# Patient Record
Sex: MALE | Race: WHITE | NOT HISPANIC OR LATINO | Employment: FULL TIME | URBAN - METROPOLITAN AREA
[De-identification: names, ages, dates, MRNs, and addresses within clinical notes are randomized per-mention and may not be internally consistent; named-entity substitution may affect disease eponyms.]

---

## 2021-10-20 ENCOUNTER — OFFICE VISIT - RIVER FALLS (OUTPATIENT)
Dept: FAMILY MEDICINE | Facility: CLINIC | Age: 31
End: 2021-10-20

## 2021-10-20 ASSESSMENT — MIFFLIN-ST. JEOR: SCORE: 1690.21

## 2021-10-21 ENCOUNTER — COMMUNICATION - RIVER FALLS (OUTPATIENT)
Dept: FAMILY MEDICINE | Facility: CLINIC | Age: 31
End: 2021-10-21

## 2021-10-21 LAB
A/G RATIO - HISTORICAL: 1.7 (ref 1–2.5)
ALBUMIN SERPL-MCNC: 4.6 GM/DL (ref 3.6–5.1)
ALP SERPL-CCNC: 70 UNIT/L (ref 36–130)
ALT SERPL W P-5'-P-CCNC: 15 UNIT/L (ref 9–46)
AST SERPL W P-5'-P-CCNC: 13 UNIT/L (ref 10–40)
BILIRUB DIRECT SERPL-MCNC: 0.2 MG/DL
BILIRUB INDIRECT SERPL-MCNC: 0.8 MG/DL (ref 0.2–1.2)
BILIRUB SERPL-MCNC: 1 MG/DL (ref 0.2–1.2)
BUN SERPL-MCNC: 15 MG/DL (ref 7–25)
BUN/CREAT RATIO - HISTORICAL: ABNORMAL (ref 6–22)
CALCIUM SERPL-MCNC: 10.2 MG/DL (ref 8.6–10.3)
CHLORIDE BLD-SCNC: 103 MMOL/L (ref 98–110)
CO2 SERPL-SCNC: 27 MMOL/L (ref 20–32)
CREAT SERPL-MCNC: 0.98 MG/DL (ref 0.6–1.35)
EGFRCR SERPLBLD CKD-EPI 2021: 104 ML/MIN/1.73M2
ERYTHROCYTE [DISTWIDTH] IN BLOOD BY AUTOMATED COUNT: 12.8 % (ref 11–15)
GLOBULIN: 2.7 (ref 1.9–3.7)
GLUCOSE BLD-MCNC: 130 MG/DL (ref 65–99)
HCT VFR BLD AUTO: 44.3 % (ref 38.5–50)
HGB BLD-MCNC: 14.7 GM/DL (ref 13.2–17.1)
MCH RBC QN AUTO: 29.7 PG (ref 27–33)
MCHC RBC AUTO-ENTMCNC: 33.2 GM/DL (ref 32–36)
MCV RBC AUTO: 89.5 FL (ref 80–100)
PLATELET # BLD AUTO: 302 10*3/UL (ref 140–400)
PMV BLD: 11.3 FL (ref 7.5–12.5)
POTASSIUM BLD-SCNC: 3.8 MMOL/L (ref 3.5–5.3)
PROT SERPL-MCNC: 7.3 GM/DL (ref 6.1–8.1)
RBC # BLD AUTO: 4.95 10*6/UL (ref 4.2–5.8)
SODIUM SERPL-SCNC: 141 MMOL/L (ref 135–146)
TSH SERPL DL<=0.005 MIU/L-ACNC: 0.85 MIU/L (ref 0.4–4.5)
WBC # BLD AUTO: 6.8 10*3/UL (ref 3.8–10.8)

## 2021-12-10 ENCOUNTER — OFFICE VISIT - RIVER FALLS (OUTPATIENT)
Dept: FAMILY MEDICINE | Facility: CLINIC | Age: 31
End: 2021-12-10

## 2021-12-10 ASSESSMENT — MIFFLIN-ST. JEOR: SCORE: 1690.21

## 2021-12-15 ENCOUNTER — OFFICE VISIT - RIVER FALLS (OUTPATIENT)
Dept: FAMILY MEDICINE | Facility: CLINIC | Age: 31
End: 2021-12-15

## 2021-12-15 ASSESSMENT — MIFFLIN-ST. JEOR: SCORE: 1676.6

## 2022-01-03 ENCOUNTER — COMMUNICATION - RIVER FALLS (OUTPATIENT)
Dept: FAMILY MEDICINE | Facility: CLINIC | Age: 32
End: 2022-01-03
Payer: COMMERCIAL

## 2022-01-06 ENCOUNTER — OFFICE VISIT - RIVER FALLS (OUTPATIENT)
Dept: FAMILY MEDICINE | Facility: CLINIC | Age: 32
End: 2022-01-06
Payer: COMMERCIAL

## 2022-02-12 VITALS
HEIGHT: 69 IN | BODY MASS INDEX: 23.99 KG/M2 | SYSTOLIC BLOOD PRESSURE: 126 MMHG | DIASTOLIC BLOOD PRESSURE: 68 MMHG | HEART RATE: 81 BPM | BODY MASS INDEX: 23.55 KG/M2 | DIASTOLIC BLOOD PRESSURE: 76 MMHG | SYSTOLIC BLOOD PRESSURE: 132 MMHG | WEIGHT: 159 LBS | OXYGEN SATURATION: 99 % | WEIGHT: 162 LBS | HEART RATE: 74 BPM | TEMPERATURE: 97.4 F | HEIGHT: 69 IN

## 2022-02-12 VITALS
OXYGEN SATURATION: 98 % | DIASTOLIC BLOOD PRESSURE: 78 MMHG | BODY MASS INDEX: 23.99 KG/M2 | TEMPERATURE: 97.3 F | WEIGHT: 162 LBS | HEIGHT: 69 IN | HEART RATE: 100 BPM | SYSTOLIC BLOOD PRESSURE: 116 MMHG

## 2022-02-15 NOTE — PROGRESS NOTES
Chief Complaint    Abdominal pain, vomiting and not sleeping well intermittenly.  History of Present Illness       Patient is here to discuss some diarrhea and vomiting.       Mom is on the phone to the system.  She gives him support because he has history of some delay.  For 5 days ago he started having frequent loose stools.  There is no blood in the stool.  He was nauseated also and did throw up several times.  He is much better now but he did throw up this morning at 6 AM.  He does feel generally tired but it has been improving.  He has kept some fluids down.  He does not have a history of this kind of problem.  Review of Systems       No fever or chills, no rashes, no headache  Physical Exam   Vitals & Measurements    T: 97.3  F (Tympanic)  HR: 100 (Peripheral)  BP: 116/78  SpO2: 98%     HT: 69 in  WT: 162 lb  BMI: 23.92        Alert and oriented       Supple neck       Lungs are clear       Abdomen is soft and nontender  Assessment/Plan       1. Diarrhea (R19.7)          His symptoms seem consistent with an acute viral gastroenteritis.  Mom has some overarching concerns about thyroid is an underlying issue so after discussion we can use Zofran to help him keep fluids down he is going to take Imodium for any diarrhea he will have labs done today and he will be off work till Friday         Ordered:          Basic Metabolic Panel* ([employer]), Specimen Type: Serum, Collection Date: 10/20/21 8:36:00 CDT          CBC (h/h, RBC, indices, WBC, Plt)* ([employer]), Specimen Type: Blood, Collection Date: 10/20/21 8:36:00 CDT          Hepatic Function Panel* ([employer]), Specimen Type: Serum, Collection Date: 10/20/21 8:36:00 CDT          TSH* ([employer]), Specimen Type: Serum, Collection Date: 10/20/21 8:36:00 CDT                Orders:         ondansetron, = 1 tab(s) ( 4 mg ), Oral, q8 hrs, PRN: for nausea, # 10 tab(s), 0 Refill(s), Type: Acute, Pharmacy: [id number], 1 tab(s) Oral q8 hrs,PRN:for nausea, 69, in, 10/20/21  8:17:00 CDT, Height Measured, 162, lb, 10/20/21 8:17:00 CDT, Weight Measured, (Ordered)  Patient Information     Name:JUSTO BARNES      Address:      [place] [address]     Sex:Male     YOB: 1990     [phone]     MRN:[id number]     FIN:[id number]     Location:[facility]     Date of Service:10/20/2021      Primary Care Physician:       NONE ,       Attending Physician:       Ramsey Cheung MD, [phone]  Problem List/Past Medical History    Ongoing     No qualifying data    Historical     No qualifying data  Medications    Zofran ODT 4 mg oral tablet, disintegrating, 4 mg= 1 tab(s), Oral, q8 hrs, PRN  Allergies    No Known Medication Allergies    No known allergies  Social History    Smoking Status     Never smoker     Electronic Cigarette/Vaping      Electronic Cigarette Use: Never.     Tobacco      Never (less than 100 in lifetime)  Immunizations          Scheduled Immunizations          Dose Date(s)          Hep A, pediatric/adolescent          01/13/2011          influenza virus vaccine, inactivated          01/05/2017          influenza, H1N1, live          11/30/2009          SARS-CoV-2 (COVID-19) Pfizer-162b2          04/17/2021, 05/08/2021          Td          01/05/2017          tetanus/diphth/pertuss (Tdap) adult/adol          11/21/2006          varicella          12/24/2002, 01/13/2011          Other Immunizations          tetanus          01/05/2017

## 2022-02-15 NOTE — PROGRESS NOTES
Patient:   JUSTO BARNES            MRN: [id number]            FIN: [id number]               Age:   31 years     Sex:  Male     :  1990   Associated Diagnoses:   None   Author:   Ramsey Cheung MD       -   Today's date:    10/20/2021.        -   To whom it may concern:        This patient is currently under my care and Was seen in my office on  10/20/2021.  .     Please excuse him/ her from work, became sick 10-.    expect return to work 10-.  Please contact me if you have any questions or concerns.      -   Sincerely,

## 2022-02-15 NOTE — NURSING NOTE
Comprehensive Intake Entered On:  12/15/2021 9:15 AM CST    Performed On:  12/15/2021 9:08 AM CST by Oanh Fajardo CMA               Summary   Chief Complaint :   Consult for sleep apnea. Falling asleep during the day w/o knowing.    Weight Measured :   159 lb(Converted to: 159 lb 0 oz, 72.121 kg)    Height Measured :   69 in(Converted to: 5 ft 9 in, 175.26 cm)    Body Mass Index :   23.48 kg/m2   Body Surface Area :   1.87 m2   Systolic Blood Pressure :   126 mmHg   Diastolic Blood Pressure :   68 mmHg   Mean Arterial Pressure :   87 mmHg   Peripheral Pulse Rate :   81 bpm   BP Site :   Right arm   BP Method :   Manual   Oxygen Saturation :   99 %   Oanh Fajardo CMA - 12/15/2021 9:08 AM CST   Health Status   Allergies Verified? :   Yes   Medication History Verified? :   Yes   Medical History Verified? :   Yes   Pre-Visit Planning Status :   Completed   Tobacco Use? :   Never smoker   Oanh Fajardo CMA - 12/15/2021 9:08 AM CST   Consents   Consent for Immunization Exchange :   Consent Granted   Consent for Immunizations to Providers :   Consent Granted   Oanh Fajardo CMA - 12/15/2021 9:08 AM CST   Meds / Allergies   (As Of: 12/15/2021 9:15:41 AM CST)   Allergies (Active)   No known allergies  Estimated Onset Date:   Unspecified ; Created By:   Trena Cloud CMA; Reaction Status:   Active ; Category:   Drug ; Substance:   No known allergies ; Type:   Allergy ; Updated By:   Trena Cloud CMA; Reviewed Date:   12/15/2021 9:12 AM CST      No Known Medication Allergies  Estimated Onset Date:   Unspecified ; Created By:   Trena Cloud CMA; Reaction Status:   Active ; Category:   Drug ; Substance:   No Known Medication Allergies ; Type:   Allergy ; Updated By:   Trena Cloud CMA; Reviewed Date:   12/15/2021 9:12 AM CST        Medication List   (As Of: 12/15/2021 9:15:41 AM CST)   Prescription/Discharge Order    ondansetron  :   ondansetron ; Status:   Prescribed ; Ordered As Mnemonic:   ondansetron 4 mg oral  tablet, disintegrating ; Simple Display Line:   1 tab(s), Oral, q8 hrs, PRN: AS NEEDED FOR NAUSEA, 20 tab(s), 0 Refill(s) ; Ordering Provider:   Ramsey Cheung MD; Catalog Code:   ondansetron ; Order Dt/Tm:   11/30/2021 6:35:55 PM CST          pantoprazole  :   pantoprazole ; Status:   Prescribed ; Ordered As Mnemonic:   Protonix 40 mg oral delayed release tablet ; Simple Display Line:   40 mg, 1 tab(s), Oral, daily, 30 tab(s), 0 Refill(s) ; Ordering Provider:   Juan Jaquez MD; Catalog Code:   pantoprazole ; Order Dt/Tm:   12/10/2021 9:30:16 AM CST            Home Meds    acetaminophen  :   acetaminophen ; Status:   Documented ; Ordered As Mnemonic:   Tylenol ; Simple Display Line:   Oral, PRN: as needed for pain, 0 Refill(s) ; Catalog Code:   acetaminophen ; Order Dt/Tm:   12/15/2021 9:12:53 AM CST

## 2022-02-15 NOTE — PROGRESS NOTES
Chief Complaint    Consult for sleep apnea. Falling asleep during the day w/o knowing.  History of Present Illness       Patient is here to discuss his sleep. Patient has been falling asleep at work. He works the third shift. But done that for a long time. He notes he usually can go to sleep by 830 sometimes 9:00 in the morning and usually gets up around 4 in the afternoon. He follows a schedule even on days of. He has never had any weakness or trouble moving extremities. He has had no weight changes no trouble breathing through his nose no shortness of breath. He is not sure why falls asleep at work but just think a little boring also on the he has had down he is hard to awaken no one is ever noticed any trouble with movements and he wakes up he seems to be okay.  Review of Systems       See HPI.  All other review of systems negative.  Physical Exam   Vitals & Measurements    HR: 81 (Peripheral)  BP: 126/68  SpO2: 99%     HT: 69 in  WT: 159 lb  BMI: 23.48        Alert and oriented normal cognition       Normal gait       No tremors       Normal oropharynx       Lungs are clear to auscultation  Assessment/Plan       1. Excessive daytime sleepiness (G47.19)         Patient with excessive sleepiness he seems to be getting an adequate amount of sleep despite working the third shift. He has good habits on his days off. We will obtain a home sleep test and he is aware he will need to wear his during the day during his routine sleep time. If this is unremarkable and does not have help he may need further evaluation for other issues   Patient Information     Name:JUSTO BARNES      Address:      [place] [address]     Sex:Male     YOB: 1990     [phone]     MRN:[id number]     FIN:[id number]     Location:[facility]     Date of Service:12/15/2021      Primary Care Physician:       NONE ,       Attending Physician:       Ramsey Cheung MD, [phone]  Problem  List/Past Medical History    Ongoing     No qualifying data    Historical     No qualifying data  Medications    ondansetron 4 mg oral tablet, disintegrating, 1 tab(s), Oral, q8 hrs, PRN    Protonix 40 mg oral delayed release tablet, 40 mg= 1 tab(s), Oral, daily    Tylenol, Oral, PRN  Allergies    No Known Medication Allergies    No known allergies  Social History    Smoking Status     Never smoker     Electronic Cigarette/Vaping      Electronic Cigarette Use: Never.     Tobacco      Never (less than 100 in lifetime)  Lab Results          Lab Results (Last 4 results within 90 days)           Sodium Level: 141 mmol/L [135 mmol/L - 146 mmol/L] (10/20/21 08:46:00)          Potassium Level: 3.8 mmol/L [3.5 mmol/L - 5.3 mmol/L] (10/20/21 08:46:00)          Chloride Level: 103 mmol/L [98 mmol/L - 110 mmol/L] (10/20/21 08:46:00)          CO2 Level: 27 mmol/L [20 mmol/L - 32 mmol/L] (10/20/21 08:46:00)          Glucose Level: 130 mg/dL High [65 mg/dL - 99 mg/dL] (10/20/21 08:46:00)          BUN: 15 mg/dL [7 mg/dL - 25 mg/dL] (10/20/21 08:46:00)          Creatinine Level: 0.98 mg/dL [0.6 mg/dL - 1.35 mg/dL] (10/20/21 08:46:00)          BUN/Creat Ratio: NOT APPLICABLE [6  - 22] (10/20/21 08:46:00)          eGFR: 104 mL/min/1.73m2 (10/20/21 08:46:00)          eGFR African American: 120 mL/min/1.73m2 (10/20/21 08:46:00)          Calcium Level: 10.2 mg/dL [8.6 mg/dL - 10.3 mg/dL] (10/20/21 08:46:00)          Bilirubin Total: 1 mg/dL [0.2 mg/dL - 1.2 mg/dL] (10/20/21 08:46:00)          Bilirubin Direct: 0.2 mg/dL (10/20/21 08:46:00)          Bilirubin Indirect: 0.8 [0.2  - 1.2] (10/20/21 08:46:00)          Alkaline Phosphatase: 70 unit/L [36 unit/L - 130 unit/L] (10/20/21 08:46:00)          AST/SGOT: 13 unit/L [10 unit/L - 40 unit/L] (10/20/21 08:46:00)          ALT/SGPT: 15 unit/L [9 unit/L - 46 unit/L] (10/20/21 08:46:00)          Protein Total: 7.3 gm/dL [6.1 gm/dL - 8.1 gm/dL] (10/20/21 08:46:00)          Albumin Level: 4.6  gm/dL [3.6 gm/dL - 5.1 gm/dL] (10/20/21 08:46:00)          Globulin: 2.7 [1.9  - 3.7] (10/20/21 08:46:00)          A/G Ratio: 1.7 [1  - 2.5] (10/20/21 08:46:00)          TSH: 0.85 mIU/L [0.4 mIU/L - 4.5 mIU/L] (10/20/21 08:46:00)          WBC: 6.8 [3.8  - 10.8] (10/20/21 08:46:00)          RBC: 4.95 [4.2  - 5.8] (10/20/21 08:46:00)          Hgb: 14.7 gm/dL [13.2 gm/dL - 17.1 gm/dL] (10/20/21 08:46:00)          Hct: 44.3 % [38.5 % - 50 %] (10/20/21 08:46:00)          MCV: 89.5 fL [80 fL - 100 fL] (10/20/21 08:46:00)          MCH: 29.7 pg [27 pg - 33 pg] (10/20/21 08:46:00)          MCHC: 33.2 gm/dL [32 gm/dL - 36 gm/dL] (10/20/21 08:46:00)          RDW: 12.8 % [11 % - 15 %] (10/20/21 08:46:00)          Platelet: 302 [140  - 400] (10/20/21 08:46:00)          MPV: 11.3 fL [7.5 fL - 12.5 fL] (10/20/21 08:46:00)  Immunizations          Scheduled Immunizations          Dose Date(s)          Hep A, pediatric/adolescent          01/13/2011          influenza virus vaccine, inactivated          01/05/2017          influenza, H1N1, live          11/30/2009          SARS-CoV-2 (COVID-19) Pfizer-162b2          04/17/2021, 05/08/2021          Td          01/05/2017          tetanus/diphth/pertuss (Tdap) adult/adol          11/21/2006          varicella          12/24/2002, 01/13/2011          Other Immunizations          tetanus          01/05/2017

## 2022-02-15 NOTE — TELEPHONE ENCOUNTER
---------------------  From: Ruth Stone CMA (eRx Pool ([id number]_[facility]))   To: [facility] Message Pool ([id number]_[place] [facility] [place]);     Sent: 10/26/2021 1:32:53 PM CDT  Subject: FW: Medication Management   Due Date/Time: 10/26/2021 3:39:00 PM CDT         Last seen 10/20/21 gastroenteritis (filled #10)      ------------------------------------------  From: [place] [id number]  To: Ramsey Cheung MD  Sent: October 23, 2021 3:39:18 PM CDT  Subject: Medication Management  Due: October 21, 2021 8:18:07 PM CDT     ** On Hold Pending Signature **     Dispensed Drug: ondansetron (ondansetron 4 mg oral tablet, disintegrating), DISSOLVE 1 TABLET ON TONGUE AND SWALLOW EVERY 8 HOURS AS NEEDED FOR NAUSEA  Quantity: 10 tab(s)  Days Supply: 3  Refills: 0  Substitutions Allowed  Notes from Pharmacy:  ---------------------------------------------------------------  From: Trena Cloud CMA ([employer] Pool ([id number]_[facility]))   To: Ruth Stone CMA;     Sent: 10/26/2021 1:55:26 PM CDT  Subject: RE: Medication Management     Please fill per protocol.---------------------  From: Ruth Stone CMA   To: [facility] Message Pool ([id number]_[place] [facility] [place]);     Sent: 10/27/2021 1:07:11 PM CDT  Subject: RE: Medication Management     Needs provider approval per the med refill spreadsheet.  Also is not an establish patient.---------------------  From: Trena Cloud CMA ([employer] Message Pool ([id number]_[facility]))   To: Ramsey Cheung MD;     Sent: 10/27/2021 2:32:58 PM CDT  Subject: Zofran---------------------  From: Ramsey Cheung MD   To: [facility]/pharmacy #[id number]    Sent: 10/27/2021 7:05:32 PM CDT  Subject: Zofran     ** Approved **  ondansetron (ONDANSETRON ODT 4 MG TABLET)  DISSOLVE 1 TABLET ON TONGUE AND SWALLOW EVERY 8 HOURS AS NEEDED FOR NAUSEA  Qty:  10 tab(s)        Days Supply:  3        Refills:  0          Substitutions Allowed     Route To Pharmacy - [facility]/pharmacy #[id number]

## 2022-02-15 NOTE — NURSING NOTE
Comprehensive Intake Entered On:  10/20/2021 8:20 AM CDT    Performed On:  10/20/2021 8:17 AM CDT by Trena Cloud CMA               Summary   Chief Complaint :   Abdominal pain, vomiting and not sleeping well intermittenly.    Weight Measured :   162 lb(Converted to: 162 lb 0 oz, 73.482 kg)    Height Measured :   69 in(Converted to: 5 ft 9 in, 175.26 cm)    Body Mass Index :   23.92 kg/m2   Body Surface Area :   1.89 m2   Systolic Blood Pressure :   116 mmHg   Diastolic Blood Pressure :   78 mmHg   Mean Arterial Pressure :   91 mmHg   Peripheral Pulse Rate :   100 bpm   BP Site :   Right arm   Pulse Site :   Radial artery   BP Method :   Electronic   HR Method :   Electronic   Temperature Tympanic :   97.3 DegF(Converted to: 36.3 DegC)  (LOW)    Oxygen Saturation :   98 %   Trena Cloud CMA - 10/20/2021 8:17 AM CDT   Health Status   Allergies Verified? :   Yes   Medication History Verified? :   Yes   Medical History Verified? :   Yes   Pre-Visit Planning Status :   Completed   Tobacco Use? :   Never smoker   Trena Cloud CMA - 10/20/2021 8:17 AM CDT   Meds / Allergies   (As Of: 10/20/2021 8:20:15 AM CDT)   Allergies (Active)   No known allergies  Estimated Onset Date:   Unspecified ; Created By:   Trena Cloud CMA; Reaction Status:   Active ; Category:   Drug ; Substance:   No known allergies ; Type:   Allergy ; Updated By:   Trena Cloud CMA; Reviewed Date:   10/20/2021 8:18 AM CDT      No Known Medication Allergies  Estimated Onset Date:   Unspecified ; Created By:   Trena Cloud CMA; Reaction Status:   Active ; Category:   Drug ; Substance:   No Known Medication Allergies ; Type:   Allergy ; Updated By:   Trena Cloud CMA; Reviewed Date:   10/20/2021 8:18 AM CDT        Medication List   (As Of: 10/20/2021 8:20:15 AM CDT)        ID Risk Screen   Recent Travel History :   No recent travel   Family Member Travel History :   No recent travel   Other Exposure to Infectious Disease :   Unknown   COVID-19  Testing Status :   No positive COVID-19 test   Trena Cloud CMA - 10/20/2021 8:17 AM CDT   Social History   Social History   (As Of: 10/20/2021 8:20:15 AM CDT)   Tobacco:        Never (less than 100 in lifetime)   (Last Updated: 10/20/2021 8:17:52 AM CDT by Trena Cloud CMA)          Electronic Cigarette/Vaping:        Electronic Cigarette Use: Never.   (Last Updated: 10/20/2021 8:17:56 AM CDT by Trena Cloud CMA)

## 2022-02-15 NOTE — TELEPHONE ENCOUNTER
---------------------  From: Marisol Gutierrez LPN (Phone Messages Pool ([id number]_[facility]))   To: [facility] Message Pool ([id number]);     Sent: 11/30/2021 3:30:35 PM CST  Subject: refill     Phone Message    PCP:   none      Time of Call:  3:20pm       Person Calling:  Rosaline  Phone number:  [phone]    Note:   Rosaline calling requesting refill of ondansetron. Pt has not had improvement in symptoms.    Last Rx 10/27/21 ondansetron 4mg 1 tab PO q8hrs PRN fo nausea #10    Rosaline informed KRAIG out of clinic today and message will be forwarded for tomorrow.    Last office visit and reason:  10/20/21 Gastroenteritis---------------------  From: Oanh Fajardo CMA ([facility] ([id number]))   To: Ramsey Cheung MD;     Sent: 11/30/2021 3:42:43 PM CST  Subject: FW: refill  ** Submitted: **  Order:ondansetron (ondansetron 4 mg oral tablet, disintegrating)  1 tab(s)  Oral  q8 hrs  Qty:  20 tab(s)        Refills:  0          Substitutions Allowed     PRN  AS NEEDED FOR NAUSEA      Route To Pharmacy - [facility]/pharmacy #[id number]    Signed by Ramsey Cheung MD  12/1/2021 12:35:00 AM [facility]---------------------  From: Ramsey Cheung MD   To: [facility] Message Pool ([id number]_[place] [facility] [place]);     Sent: 11/30/2021 6:36:38 PM CST  Subject: RE: refill     i sent in meds but may need to be rechecked since not improvingNO LILIANA TO SPEAK WITH MOTHER/CALLER. Called chart # @ [phone] with no answer and no vm available. Need to speak w/ patient directlyLeft message for patient with provider's message.

## 2022-02-15 NOTE — LETTER
(Inserted Image. Unable to display)   [address] [place] [address]  October 21, 2021        [address]O [id number] [place] [address]        Dear JUSTO,    Thank you for choosing [facility] for your healthcare needs. Below you will find the results of your recent test(s) done at our clinic.      the thyroid and other tests are all normal for a non fasting test      Result Name Current Result Reference Range   Glucose Level (mg/dL) ((H)) 130 10/20/2021 65 - 99   BUN (mg/dL)  15 10/20/2021 7 - 25   Creatinine Level (mg/dL)  0.98 10/20/2021 0.60 - 1.35   eGFR (mL/min/1.73m2)  104 10/20/2021 > OR = 60 -    eGFR  (mL/min/1.73m2)  120 10/20/2021 > OR = 60 -    Sodium Level (mmol/L)  141 10/20/2021 135 - 146   Potassium Level (mmol/L)  3.8 10/20/2021 3.5 - 5.3   Chloride Level (mmol/L)  103 10/20/2021 98 - 110   CO2 Level (mmol/L)  27 10/20/2021 20 - 32   Calcium Level (mg/dL)  10.2 10/20/2021 8.6 - 10.3   Protein Total (gm/dL)  7.3 10/20/2021 6.1 - 8.1   Albumin Level (gm/dL)  4.6 10/20/2021 3.6 - 5.1   Globulin  2.7 10/20/2021 1.9 - 3.7   A/G Ratio  1.7 10/20/2021 1.0 - 2.5   Bilirubin Total (mg/dL)  1.0 10/20/2021 0.2 - 1.2   Bilirubin Direct (mg/dL)  0.2 10/20/2021  - < OR = 0.2   Bilirubin Indirect  0.8 10/20/2021 0.2 - 1.2   Alkaline Phosphatase (unit/L)  70 10/20/2021 36 - 130   AST/SGOT (unit/L)  13 10/20/2021 10 - 40   ALT/SGPT (unit/L)  15 10/20/2021 9 - 46   WBC  6.8 10/20/2021 3.8 - 10.8   RBC  4.95 10/20/2021 4.20 - 5.80   Hgb (gm/dL)  14.7 10/20/2021 13.2 - 17.1   Hct (%)  44.3 10/20/2021 38.5 - 50.0   MCV (fL)  89.5 10/20/2021 80.0 - 100.0   MCH (pg)  29.7 10/20/2021 27.0 - 33.0   MCHC (gm/dL)  33.2 10/20/2021 32.0 - 36.0   RDW (%)  12.8 10/20/2021 11.0 - 15.0   Platelet  302 10/20/2021 140 - 400   MPV (fL)  11.3 10/20/2021 7.5 - 12.5   TSH (mIU/L)  0.85 10/20/2021 0.40 - 4.50       Please contact me or my assistant at [phone] if  you have any questions or concerns.     Sincerely,        Ramsey Cheung MD        What do your labs mean?  Below is a glossary of commonly ordered labs:  LDL   Bad Cholesterol   HDL   Good Cholesterol  AST/ALT   Liver Function   Cr/Creatinine   Kidney Function  Microalbumin   Kidney Function  BUN   Kidney Function  PSA   Prostate    TSH   Thyroid Hormone  HgbA1c   Diabetes Test   Hgb (Hemoglobin)   Red Blood Cells

## 2022-02-15 NOTE — NURSING NOTE
Comprehensive Intake Entered On:  12/10/2021 9:22 AM CST    Performed On:  12/10/2021 9:17 AM CST by Sania Mayes LPN               Summary   Chief Complaint :   nausea and fatigue continues, has been falling asleep at work.    Weight Measured :   162 lb(Converted to: 162 lb 0 oz, 73.482 kg)    Height Measured :   69 in(Converted to: 5 ft 9 in, 175.26 cm)    Body Mass Index :   23.92 kg/m2   Body Surface Area :   1.89 m2   Systolic Blood Pressure :   132 mmHg (HI)    Diastolic Blood Pressure :   76 mmHg   Mean Arterial Pressure :   95 mmHg   Peripheral Pulse Rate :   74 bpm   BP Site :   Right arm   Pulse Site :   Brachial artery   BP Method :   Electronic   HR Method :   Electronic   Temperature Tympanic :   97.4 DegF(Converted to: 36.3 DegC)  (LOW)    Sania Mayes LPN - 12/10/2021 9:17 AM CST   Health Status   Allergies Verified? :   Yes   Medication History Verified? :   Yes   Pre-Visit Planning Status :   Completed   Tobacco Use? :   Never smoker   Sania Mayes LPN - 12/10/2021 9:17 AM CST   Consents   Consent for Immunization Exchange :   Consent Granted   Consent for Immunizations to Providers :   Consent Granted   Sania Mayes LPN - 12/10/2021 9:17 AM CST   Meds / Allergies   (As Of: 12/10/2021 9:22:00 AM CST)   Allergies (Active)   No known allergies  Estimated Onset Date:   Unspecified ; Created By:   Trena Cloud CMA; Reaction Status:   Active ; Category:   Drug ; Substance:   No known allergies ; Type:   Allergy ; Updated By:   Trena Cloud CMA; Reviewed Date:   10/20/2021 8:18 AM CDT      No Known Medication Allergies  Estimated Onset Date:   Unspecified ; Created By:   Trena Cloud CMA; Reaction Status:   Active ; Category:   Drug ; Substance:   No Known Medication Allergies ; Type:   Allergy ; Updated By:   Trena Cloud CMA; Reviewed Date:   10/20/2021 8:18 AM CDT        Medication List   (As Of: 12/10/2021 9:22:00 AM CST)   Prescription/Discharge Order    ondansetron  :    ondansetron ; Status:   Prescribed ; Ordered As Mnemonic:   ondansetron 4 mg oral tablet, disintegrating ; Simple Display Line:   1 tab(s), Oral, q8 hrs, PRN: AS NEEDED FOR NAUSEA, 20 tab(s), 0 Refill(s) ; Ordering Provider:   Ramsey Cheung MD; Catalog Code:   ondansetron ; Order Dt/Tm:   11/30/2021 6:35:55 PM CST

## 2022-02-15 NOTE — PROGRESS NOTES
Patient:   JUSTO BARNES            MRN: [id number]            FIN: [id number]               Age:   31 years     Sex:  Male     :  1990   Associated Diagnoses:   Chronic nausea; Disordered sleep   Author:   Juan Jaquez MD      Visit Information      Date of Service: 12/10/2021 09:14 am  Performing Location: [facility]  Encounter#: [id number]      Primary Care Provider (PCP):  NONE ,       Referring Provider:  Juan Jaquez MD    NPI# [id number]      Chief Complaint   12/10/2021 9:17 AM CST   nausea and fatigue continues, has been falling asleep at work.        History of Present Illness   Patient is in today for follow-up.  He notes he works straight nights and has trouble falling asleep at work.  It is getting him in trouble.  He is wondering if he can do anything about that.  He still has his nausea issues he takes ibuprofen a couple times a day for neck related issues working.  He may be vomits once a week.  No significant headache no fevers chills sweats chest pain shortness of breath no abdominal pain no stool changes no urinary change no rashes         Review of Systems   Constitutional:  Negative except as documented in history of present illness.    Eye:  Negative.    Ear/Nose/Mouth/Throat:  Negative.    Respiratory:  Negative.    Cardiovascular:  Negative.    Gastrointestinal:  Negative except as documented in history of present illness.    Genitourinary:  Negative.    Musculoskeletal:  Negative except as documented in history of present illness.    Integumentary:  Negative.    Neurologic:  Negative.       Health Status   Allergies:    Allergic Reactions (Selected)  No known allergies  No Known Medication Allergies   Medications:  (Selected)   Prescriptions  Prescribed  Protonix 40 mg oral delayed release tablet: = 1 tab(s) ( 40 mg ), Oral, daily, # 30 tab(s), 0 Refill(s), Type: Maintenance, Pharmacy: [facility]/pharmacy #[id number], 1 tab(s) Oral daily, 69, in, 12/10/21  9:17:00 CST, Height Measured, 162, lb, 12/10/21 9:17:00 CST, Weight Measured  ondansetron 4 mg oral tablet, disintegrating: = 1 tab(s), Oral, q8 hrs, PRN: AS NEEDED FOR NAUSEA, # 20 tab(s), 0 Refill(s), Type: Maintenance, Pharmacy: [facility]/pharmacy #[id number], 1 tab(s) Oral q8 hrs,PRN:AS NEEDED FOR NAUSEA, 69, in, 10/20/21 8:17:00 CDT, Height Measured, 162, lb, 10/20/21 8:17:00 CD...      Histories   Past Medical History:    No active or resolved past medical history items have been selected or recorded.   Family History:    No family history items have been selected or recorded.   Procedure history:    No active procedure history items have been selected or recorded.   Social History:        Electronic Cigarette/Vaping Assessment            Electronic Cigarette Use: Never.      Tobacco Assessment            Never (less than 100 in lifetime)        Physical Examination   Measurements from flowsheet : Measurements   12/10/2021 9:17 AM CST Height Measured - Standard 69 in    Weight Measured - Standard 162 lb    BSA 1.89 m2    Body Mass Index 23.92 kg/m2      General:  Alert and oriented, No acute distress.    Eye:  Pupils are equal, round and reactive to light, Extraocular movements are intact.    HENT:  Normocephalic, Oral mucosa is moist.    Neck:  Supple, Non-tender, No lymphadenopathy, No thyromegaly.    Respiratory:  Lungs are clear to auscultation, Respirations are non-labored, Breath sounds are equal.    Cardiovascular:  Normal rate, Regular rhythm, No murmur, Good pulses equal in all extremities, No edema.    Gastrointestinal:  Soft, Non-tender, Non-distended, No organomegaly.    Musculoskeletal:  No tenderness, No swelling, No deformity.    Integumentary:  Warm, Dry.    Neurologic:  Alert, Oriented, No focal deficits, Cranial Nerves II-XII are grossly intact.    Psychiatric:  Cooperative, Appropriate mood & affect, Normal judgment.       Impression and Plan   Diagnosis     Chronic nausea (ATJ33-BV R11.0).      Disordered sleep (LLA57-IM G47.9).     Plan:  Patient with troubles with staying awake at work.  Certainly is likely a day night sleep problem.  We will set him up with Dr. Cheung sleep physician to see if we can help him at work.  He has had hematologic work-up for by Dr. Cheung a few months ago we will not repeat that now.  As far as his stomach goes with the nausea I think is from his ibuprofen he will stop his ibuprofen completely switch to Tylenol if needed we will trial Protonix for 1 month only if his symptoms are improving he will follow-up with Dr. Cheung on that as well  .

## 2022-02-23 ENCOUNTER — MEDICAL CORRESPONDENCE (OUTPATIENT)
Dept: HEALTH INFORMATION MANAGEMENT | Facility: CLINIC | Age: 32
End: 2022-02-23
Payer: COMMERCIAL

## 2022-02-23 ENCOUNTER — AMBULATORY - RIVER FALLS (OUTPATIENT)
Dept: FAMILY MEDICINE | Facility: CLINIC | Age: 32
End: 2022-02-23
Payer: COMMERCIAL

## 2022-03-02 VITALS
TEMPERATURE: 97.6 F | DIASTOLIC BLOOD PRESSURE: 64 MMHG | WEIGHT: 157 LBS | SYSTOLIC BLOOD PRESSURE: 120 MMHG | BODY MASS INDEX: 23.25 KG/M2 | HEIGHT: 69 IN | HEART RATE: 80 BPM

## 2022-03-02 NOTE — TELEPHONE ENCOUNTER
---------------------  From: oDnya Garrison RN (Phone Messages Pool ([id number]_[employer]))   To: KRAIG Message Pool ([id number]_[place] [facility] [place]);     Sent: 12/27/2021 3:53:26 PM CST  Subject: Insurance question       PCP:   KRAIG      Time of Call:  1551       Person Calling:  Pt's momVictorina  Phone number:  [phone]    Note:   Mom calling to see if there was any information about if insurance approved the sleep study or not.  I don't see anything about this in the chart,  please advise.    Last office visit and reason:  12/15/21  Physicalsee 1/3 note

## 2022-03-02 NOTE — TELEPHONE ENCOUNTER
---------------------  From: Samara Gavin RN (Phone Messages Pool ([id number]_[facility]))   To: Sleep Message Pool ([id number]_[place] [facility] [place]);     Sent: 1/3/2022 4:24:23 PM CST  Subject: sleep study       PCP:   KRAIG     Time of Call:  3:46pm       Person Calling:  pt  Phone number:  [phone]    Note:   VM received from pt, stating KRAIG ordered a home sleep study about 2 weeks ago. Stated he has not heard from anyone.    Please advise.     Last office visit and reason:  12/15/21 sleep ZIM---------------------  From: Phyllis Basurto (Sleep Message Pool ([id number]_[facility]))   To: Christy Mcbride;     Sent: 1/3/2022 4:37:01 PM CST  Subject: FW: sleep study         Forwarded to Christy in sleep study scheduling.PT SCHEDULED

## 2022-03-02 NOTE — TELEPHONE ENCOUNTER
Called patient in regards to his home sleep study referral. Supplies are in so we are able to get the appointment rescheduled. Unable to leave a voicemail.

## 2022-03-02 NOTE — TELEPHONE ENCOUNTER
Entered by Brie Schrader RN on January 05, 2022 2:53:23 PM CST  ---------------------  From: Brie Schrader RN   [id number]    Sent: 1/5/2022 2:53:23 PM CST  Subject: Medication Management     ** Not Approved: Patient needs appointment **  ondansetron (ONDANSETRON ODT 4 MG TABLET)  1 TAB(S) BY MOUTH EVERY EIGHT HOURS:AS NEEDED FOR NAUSEA  Qty:  8 tab(s)        Days Supply:  3        Refills:  2          Substitutions Allowed     [id number]   Signed by Brie Schrader RN            ------------------------------------------  From: [facility] STORE [id number]  To: Ramsey Cheung MD  Sent: January 1, 2022 4:11:48 PM CST  Subject: Medication Management  Due: December 15, 2021 8:20:29 PM CST     ** On Hold Pending Signature **     Dispensed Drug: ondansetron (ondansetron 4 mg oral tablet, disintegrating), 1 TAB(S) BY MOUTH EVERY EIGHT HOURS:AS NEEDED FOR NAUSEA  Quantity: 8 tab(s)  Days Supply: 3  Refills: 2  Substitutions Allowed  Notes from Pharmacy:  ------------------------------------------Pt needs appt with provider to discuss sx and treatment.  Call to pt at [phone]  Transferred to scheduling for virtual visit

## 2022-03-02 NOTE — TELEPHONE ENCOUNTER
Entered by Doretha Castillo CMA on January 05, 2022 1:56:04 PM CST  ---------------------  From: Doretha Castillo CMA   To: [facility]/pharmacy #[id number]    Sent: 1/5/2022 1:56:04 PM CST  Subject: Medication Management     ** Submitted: **  Order:pantoprazole (pantoprazole 40 mg oral delayed release tablet)  1 tab(s)  Oral  daily  Qty:  30 tab(s)        Days Supply:  30        Refills:  0          Substitutions Allowed     Route To Pharmacy - [facility]/pharmacy #[id number]    Signed by Doretha Castillo CMA  1/5/2022 7:55:00 PM [facility]    ** Submitted: **  Complete:pantoprazole (Protonix 40 mg oral delayed release tablet)   Signed by Doretha Castillo CMA  1/5/2022 7:55:00 PM [facility]    ** Not Approved:  **  pantoprazole (PANTOPRAZOLE SOD DR 40 MG TAB)  TAKE 1 TABLET BY MOUTH EVERY DAY  Qty:  30 tab(s)        Days Supply:  30        Refills:  0          Substitutions Allowed     Route To Pharmacy - [facility]/pharmacy #[id number]   Signed by Doretha Castillo CMA            ------------------------------------------  From: [facility] STORE [id number]  To: Juan Jaquez MD  Sent: January 1, 2022 4:10:23 PM CST  Subject: Medication Management  Due: December 15, 2021 8:09:06 PM CST     ** On Hold Pending Signature **     Dispensed Drug: pantoprazole (pantoprazole 40 mg oral delayed release tablet), TAKE 1 TABLET BY MOUTH EVERY DAY  Quantity: 30 tab(s)  Days Supply: 30  Refills: 0  Substitutions Allowed  Notes from Pharmacy:  ------------------------------------------sleep study scheduled 1/20. will refill one more month.

## 2022-03-02 NOTE — PROGRESS NOTES
Patient:   JUSTO BARNES            MRN: [id number]            FIN: [id number]               Age:   31 years     Sex:  Male     :  1990   Associated Diagnoses:   Gastritis   Author:   Ramsey Cheung MD      Visit Information      Date of Service: 2022 08:59 am  Performing Location: [facility]  Encounter#: [id number]      Primary Care Provider (PCP):  NONE ,       Referring Provider:  Ramsey Cheung MD    NPI# [id number]      Chief Complaint   2022 9:22 AM CST     medication check and refills.        Subjective   Chief complaint 2022 9:22 AM CST     medication check and refills.  .     the zofran is helping and he is getting through work without throwing up,  he is taking the pantoprazole and is not having abdominal pain  no weight changes  has not done sleep test yet but is waiting on insurance approval      Health Status   Allergies:    Allergic Reactions (Selected)  No known allergies  No Known Medication Allergies   Medications:  (Selected)   Prescriptions  Prescribed  ondansetron 4 mg oral tablet, disintegrating: = 1 tab(s), Oral, daily, PRN: AS NEEDED FOR NAUSEA, # 30 tab(s), 2 Refill(s), Type: Maintenance, Pharmacy: [facility]/pharmacy #[id number], 1 tab(s) Oral daily,PRN:AS NEEDED FOR NAUSEA, 69, in, 22 9:22:00 CST, Height Measured, 157, lb, 22 9:22:00 CST,...  pantoprazole 40 mg oral delayed release tablet: = 1 tab(s), Oral, daily, # 30 tab(s), 3 Refill(s), Type: Maintenance, Pharmacy: [facility]/pharmacy #[id number], 1 tab(s) Oral daily, 69, in, 22 9:22:00 CST, Height Measured, 157, lb, 22 9:22:00 CST, Weight Measured  Documented Medications  Documented  Tylenol: Oral, PRN: as needed for pain, 0 Refill(s), Type: Maintenance,    Medications          *denotes recorded medication          *Tylenol: Oral, PRN: as needed for pain, 0 Refill(s).          ondansetron 4 mg oral tablet, disintegratin tab(s), Oral, daily, PRN: AS NEEDED FOR NAUSEA, 30 tab(s), 2  Refill(s).          pantoprazole 40 mg oral delayed release tablet: 1 tab(s), Oral, daily, 30 tab(s), 3 Refill(s).          Objective   Vital Signs   1/6/2022 9:22 AM CST Temperature Tympanic 97.6 DegF  LOW    Peripheral Pulse Rate 80 bpm    Pulse Site Radial artery    HR Method Manual    Systolic Blood Pressure 120 mmHg    Diastolic Blood Pressure 64 mmHg    Mean Arterial Pressure 83 mmHg    BP Site Right arm    BP Method Manual      Measurements from flowsheet : Measurements   1/6/2022 9:22 AM CST Height Measured - Standard 69 in    Weight Measured - Standard 157 lb    BSA 1.86 m2    Body Mass Index 23.18 kg/m2      alert and oriented  moving well      Results Review   Results review   Lab results   10/20/2021 8:46 AM CDT Sodium Level 141 mmol/L    Potassium Level 3.8 mmol/L    Chloride Level 103 mmol/L    CO2 Level 27 mmol/L    Glucose Level 130 mg/dL  HI    BUN 15 mg/dL    Creatinine 0.98 mg/dL    BUN/Creat Ratio NOT APPLICABLE    eGFR 104 mL/min/1.73m2    eGFR African American 120 mL/min/1.73m2    Calcium Level 10.2 mg/dL    Bili Total 1.0 mg/dL    Bili Direct 0.2 mg/dL    Bili Indirect 0.8    Alk Phos 70 unit/L    AST/SGOT 13 unit/L    ALT/SGPT 15 unit/L    Protein Total 7.3 gm/dL    Albumin Level 4.6 gm/dL    Globulin 2.7    A/G Ratio 1.7    TSH 0.85 mIU/L    WBC 6.8    RBC 4.95    Hgb 14.7 gm/dL    Hct 44.3 %    MCV 89.5 fL    MCH 29.7 pg    MCHC 33.2 gm/dL    RDW 12.8 %    Platelet 302    MPV 11.3 fL         Impression and Plan   Assessment and Plan:          Diagnosis: Gastritis (SBQ79-UY K29.70).         Course: normal labs  improving GI function and better control of s;ymptoms  refilled meds    still sleepy and waiting for sleep test.

## 2022-03-02 NOTE — TELEPHONE ENCOUNTER
---------------------  From: Christy Mcbride   To: Woo Appiah MD;     Sent: 2/25/2022 11:35:58 AM CST  Subject: HST     Patient has a home sleep study uploaded into [employer] ready for interpretation---------------------  From: Woo Appiah MD   To: Christy Mcbride;     Sent: 2/25/2022 2:34:10 PM CST  Subject: RE: HST     done

## 2022-03-03 ENCOUNTER — OFFICE VISIT (OUTPATIENT)
Dept: FAMILY MEDICINE | Facility: CLINIC | Age: 32
End: 2022-03-03
Payer: COMMERCIAL

## 2022-03-03 VITALS
BODY MASS INDEX: 23.11 KG/M2 | SYSTOLIC BLOOD PRESSURE: 118 MMHG | TEMPERATURE: 98.6 F | DIASTOLIC BLOOD PRESSURE: 70 MMHG | HEART RATE: 60 BPM | HEIGHT: 69 IN | WEIGHT: 156 LBS

## 2022-03-03 DIAGNOSIS — K52.9 COLITIS: Primary | ICD-10-CM

## 2022-03-03 PROCEDURE — 99213 OFFICE O/P EST LOW 20 MIN: CPT | Performed by: FAMILY MEDICINE

## 2022-03-03 RX ORDER — ONDANSETRON 4 MG/1
4 TABLET, ORALLY DISINTEGRATING ORAL DAILY PRN
Qty: 16 TABLET | Refills: 1 | Status: SHIPPED | OUTPATIENT
Start: 2022-03-03

## 2022-03-03 RX ORDER — PANTOPRAZOLE SODIUM 40 MG/1
1 TABLET, DELAYED RELEASE ORAL DAILY
COMMUNITY
Start: 2022-02-01

## 2022-03-03 RX ORDER — ONDANSETRON 4 MG/1
1 TABLET, ORALLY DISINTEGRATING ORAL DAILY PRN
COMMUNITY
Start: 2021-11-30 | End: 2022-03-03

## 2022-03-03 ASSESSMENT — ENCOUNTER SYMPTOMS: HEMATOCHEZIA: 1

## 2022-03-03 NOTE — PROGRESS NOTES
"  Assessment & Plan     Colitis  Single episode of bloody mucus passed with some mild abdominal discomfort.  Exam is unremarkable.  He does not appear severely sick.  We will refill some Zofran since he is used it in the past with his upper GI problems.  I note those have not been aggravated has not been having pain to suggest ulcer.  I think this is probably lower colitis and easily use over-the-counter Pepto-Bismol and hopefully this will pass.  If symptoms continue or get worse he will let us know  - ondansetron (ZOFRAN-ODT) 4 MG ODT tab; Take 1 tablet (4 mg) by mouth daily as needed for nausea          Return for will return if worse or not improving.    Ramsey Cheung MD  [facility]    Lee Ann Finch is a 31 year old who presents for the following health issues     Rectal Bleeding    History of Present Illness     Reason for visit:  Anal bleeding  Symptom onset:  1-3 days ago    He eats 0-1 servings of fruits and vegetables daily.He consumes 5 sweetened beverage(s) daily.He exercises with enough effort to increase his heart rate 9 or less minutes per day.  He exercises with enough effort to increase his heart rate 3 or less days per week. He is missing 3 dose(s) of medications per week.    Just discovered it this morning, a light amount.  Has had some lower abdominal and rectal discomfort for the past couple days.     Follow up on sleeping issues.    Review of Systems   Gastrointestinal: Positive for hematochezia.      Constitutional, HEENT, cardiovascular, pulmonary, gi and gu systems are negative, except as otherwise noted.      Objective    /70   Pulse 60   Temp 98.6  F (37  C)   Ht 1.753 m (5' 9\")   Wt 70.8 kg (156 lb)   BMI 23.04 kg/m    Body mass index is 23.04 kg/m .  Physical Exam   GENERAL: healthy, alert and no distress  NECK: no adenopathy, no asymmetry, masses, or scars and thyroid normal to palpation  RESP: lungs clear to auscultation - no rales, " rhonchi or wheezes  CV: regular rate and rhythm, normal S1 S2, no S3 or S4, no murmur, click or rub, no peripheral edema and peripheral pulses strong  ABDOMEN: soft, nontender, no hepatosplenomegaly, no masses and bowel sounds normal  RECTAL (male): Normal external anus  MS: no gross musculoskeletal defects noted, no edema

## 2022-03-03 NOTE — PATIENT INSTRUCTIONS
Patient Education     Traveler's Diarrhea (Adult)    Traveler's diarrhea is an infection in the digestive tract that's usually caused by bacteria called E coli. This bacteria is commonly found in water supplies of developing countries. The local people of those countries are used to E coli in the water and don't get sick. Tourists who drink contaminated water or eat foods that were washed or prepared with this water may become very ill.   The illness begins 1 to 3 days after exposure and lasts up to 5 days. Symptoms are usually mild. But they can be bothersome and inconvenient, with watery diarrhea and stomach cramping. Sometimes you may have fever or vomiting. You may also have blood or mucus in the stool. Mild cases will get better without treatment. Antibiotics are used for more severe cases.   Home care        You may use acetaminophen or ibuprofen to control fever, unless another medicine was prescribed. If you have chronic liver or kidney disease or have ever had a stomach ulcer or gastrointestinal bleeding, talk with the healthcare provider before using these medicines. Aspirin should never be used in anyone under 18 years of age who is ill with a fever. It may cause severe illness or death.        Don't take over-the-counter medicines for diarrhea, unless advised by the healthcare provider. Never take these kinds of medicine if your diarrhea is bloody.    OK for peptobismol    During the first 12 to 24 hours, follow the diet below:     Fruit juices. Apple, grape and cranberry juice, clear fruit drinks, electrolyte replacements, and sports drinks.    Beverages. Soft drinks without caffeine, mineral water (plain or flavored), and decaffeinated tea and coffee.    Soups. Clear broth, consommé, and bouillon.    Desserts. Plain gelatin, popsicles, and fruit juice bars. As you feel better, you may add 6 to 8 ounces of yogurt per day.  During the next 24 hours, you may add the following to the above:     Hot cereal,  plain toast, bread, rolls, or crackers    Plain noodles, rice, mashed potatoes, or chicken noodle or rice soup    Unsweetened canned fruit (not pineapple) and bananas    Limit how much fat you eat to less than 15 grams per day. Don't have margarine, butter, oils, mayonnaise, sauces, gravies, fried foods, peanut butter, meat, poultry, or fish.    Limit fiber. Don't eat raw or cooked vegetables, fresh fruits (except bananas), or bran cereals.    Limit caffeine and chocolate. Don't have any spices or seasonings except salt.  During the next 24 hours, you can gradually resume a normal diet as the symptoms lessen.   Follow-up care  Follow up with your healthcare provider, or as advised. Call if you are not getting better within 24 hours or if the diarrhea lasts more than 1 week on antibiotics. If a stool (diarrhea) sample was taken, you may call in 1 to 2 days or as directed for the results.   When to seek medical advice  Call your healthcare provider if any of these happen:     Severe constant pain in the lower part of the belly, on the right side    Blood in diarrhea or vomit, dark coffee ground appearing vomit, or dark tarry stools    You aren't able to keep liquids down (continued vomiting)    Diarrhea that happens more than 5 times a day, or red or black blood or mucus in diarrhea    You don't have good appetite    You aren't urinating as much as normal or you are very thirsty    Fever of 100.4 F (38 C) or higher, or as directed by your healthcare provider  Call 911  Call 911 if any of the following occur:       Weakness, dizziness, or fainting    Drowsiness, confusion, stiff neck, or seizure    [employer] last reviewed this educational content on 8/1/2020 2000-2021 [employer]. All rights reserved. This information is not intended as a substitute for professional medical care. Always follow your healthcare professional's instructions.

## 2022-03-07 ENCOUNTER — TELEPHONE (OUTPATIENT)
Dept: FAMILY MEDICINE | Facility: CLINIC | Age: 32
End: 2022-03-07
Payer: COMMERCIAL

## 2022-03-07 NOTE — LETTER
March 8, 2022      RE: Stef Viveros   [place] [address]       To whom it may concern:    Stef Viveros was seen in our clinic for abdominal discomfort. He may return to work with the following: No working or lifting restrictions on 3-9-22.        Sincerely,      Ramsey Cheung MD

## 2022-03-07 NOTE — LETTER
March 7, 2022      Stef Viveros   [place] [address]        To Whom It May Concern:    Stef Viveros  was seen on 3-3-2022.  Please excuse him  until 3-8-2022 due to illness.        Sincerely,        Ramsey Cheung MD

## 2022-03-07 NOTE — TELEPHONE ENCOUNTER
Reason for Call:  Other Needs letter    Detailed comments: Patient is missing quite a bit of work.  Patient needs a letter from work on why he is missing so much work. He needs to have the reason he is missing.  Please fax letter to: [phone].    Phone Number Patient can be reached at: Cell number on file:    Telephone Information:   Mobile [phone]       Best Time: any    Can we leave a detailed message on this number? YES    Call taken on 3/7/2022 at 3:40 PM by BARI DENG

## 2022-03-09 ENCOUNTER — OFFICE VISIT (OUTPATIENT)
Dept: FAMILY MEDICINE | Facility: CLINIC | Age: 32
End: 2022-03-09
Payer: COMMERCIAL

## 2022-03-09 VITALS
WEIGHT: 156 LBS | BODY MASS INDEX: 23.04 KG/M2 | SYSTOLIC BLOOD PRESSURE: 120 MMHG | TEMPERATURE: 97.5 F | HEART RATE: 71 BPM | DIASTOLIC BLOOD PRESSURE: 72 MMHG

## 2022-03-09 DIAGNOSIS — K52.9 COLITIS: ICD-10-CM

## 2022-03-09 DIAGNOSIS — F41.9 ANXIETY: Primary | ICD-10-CM

## 2022-03-09 DIAGNOSIS — G47.33 OSA (OBSTRUCTIVE SLEEP APNEA): ICD-10-CM

## 2022-03-09 PROCEDURE — 99214 OFFICE O/P EST MOD 30 MIN: CPT | Performed by: FAMILY MEDICINE

## 2022-03-09 RX ORDER — CITALOPRAM HYDROBROMIDE 20 MG/1
20 TABLET ORAL DAILY
Qty: 90 TABLET | Refills: 1 | Status: SHIPPED | OUTPATIENT
Start: 2022-03-09 | End: 2022-06-07

## 2022-03-09 NOTE — PROGRESS NOTES
Assessment & Plan     Anxiety  History of multiple symptoms might be related to anxiety but has been reluctant to start treatment.  Problem have agreed getting deals with worry gets over excited.  We will try citalopram and follow-up in 1 month.  We discussed other activities that are helpful to relieve stress but we can discuss counseling and other activities in future visits  - citalopram (CELEXA) 20 MG tablet; Take 1 tablet (20 mg) by mouth daily  - CPAP Order for DME - ONLY FOR DME    CJ (obstructive sleep apnea)  Home sleep testing that shows an AHI of 14 was excessive daytime sleepiness he has witnessed apneas so well do a trial of auto titrating CPAP    Colitis  Symptoms have resolved and he is doing better      [id number]}     MEDICATIONS:  Continue current medications without change  SELF MONITORING:       - sleep eval  See Patient Instructions    Return in about 4 weeks (around 4/6/2022) for Follow up.    Ramsey Cheung MD  [facility]    Lee Ann Finch is a 31 year old who presents for the following health issues     History of Present Illness       Reason for visit:  Anal bleeding  Symptom onset:  1-3 days ago    He eats 0-1 servings of fruits and vegetables daily.He consumes 5 sweetened beverage(s) daily.He exercises with enough effort to increase his heart rate 9 or less minutes per day.  He exercises with enough effort to increase his heart rate 3 or less days per week. He is missing 3 dose(s) of medications per week.       Patient presents with:  Sleep Study: Results Follow-up  Rectal Bleeding: Follow-up, N&V not improving          Review of Systems   Constitutional, HEENT, cardiovascular, pulmonary, gi and gu systems are negative, except as otherwise noted.      Objective    Wt 70.8 kg (156 lb)   BMI 23.04 kg/m    Body mass index is 23.04 kg/m .  Physical Exam   GENERAL: healthy, alert and no distress  NECK: no adenopathy, no asymmetry, masses, or scars and  thyroid normal to palpation  RESP: lungs clear to auscultation - no rales, rhonchi or wheezes  CV: regular rate and rhythm, normal S1 S2, no S3 or S4, no murmur, click or rub, no peripheral edema and peripheral pulses strong  ABDOMEN: soft, nontender, no hepatosplenomegaly, no masses and bowel sounds normal  MS: no gross musculoskeletal defects noted, no edema    **Mild CJ with CHERYL 13.8, JUAN CARLOS 10.2, and oximetry fariha 82%.

## 2022-03-09 NOTE — LETTER
March 9, 2022      Stef Viveros   [place] [address]        To Whom It May Concern:    Stef Viveros  was seen on 3-3-3022 and 3-9-2022.  Please excuse him  until 3- due to illness.        Sincerely,        Ramsey Cheung MD

## 2022-03-09 NOTE — PATIENT INSTRUCTIONS
Patient Education     Treating Anxiety Disorders with Therapy    If you have an anxiety disorder, you don t have to suffer needlessly. Treatment is available. Therapy (also called counseling) is often a helpful treatment for anxiety disorders. With therapy, a trained professional (therapist) helps you face and learn to manage your anxiety. Therapy can be short-term or long-term based on your needs. In some cases, medicine may also be prescribed with therapy. It may take time before you notice how much therapy is helping, but stick with it. With therapy, you can feel better.   Cognitive behavioral therapy (CBT)  Cognitive behavioral therapy (CBT) teaches you to manage anxiety. It does this by helping you understand how you think and act when you re anxious. Research has shown CBT to be a very effective treatment for anxiety disorders. CBT involves homework and activities to build skills that teach you to cope with anxiety step by step. It can be done in a group or 1-on-1, and often takes place for a set number of sessions. CBT has 2 main parts:     Cognitive therapy. This helps you identify the negative, irrational thoughts that occur with your anxiety. You ll learn to replace these with more positive, realistic thoughts.    Behavioral therapy. This helps you change how you react to anxiety. You ll learn coping skills and methods for relaxing to help you better deal with anxiety.  Other forms of therapy  Other therapy methods may work better for you than CBT. Or, you may move from CBT to another form of therapy as your treatment needs change. This may mean meeting with a therapist by yourself or in a group. Therapy can also help you work through problems in your life, such as drug or alcohol dependence, that may be making your anxiety worse.   Getting better takes time  Therapy will help you feel better and teach you skills to help manage anxiety long term. But change doesn t happen right away. It takes a  commitment from you. And treatment only works if you learn to face the causes of your anxiety. So, you might feel worse before you feel better. This can sometimes make it hard to stick with it. But remember: Therapy is a very effective treatment. The results will be well worth it.   Helping yourself  If anxiety is wearing you down, here are some things you can do to cope:    Check with your healthcare provider and rule out any physical problems that may be causing the anxiety symptoms.    If you are diagnosed with an anxiety disorder, seek mental healthcare. This is an illness and it can respond to treatment. Most types of anxiety disorders will respond to talk therapy and medicine.    Educate yourself about anxiety disorders. Keep track of helpful online resources and books you can use during stressful periods.    Try stress management methods such as meditation.    Consider online or in-person support groups.    Don t fight your feelings. Anxiety feeds itself. The more you worry about it, the worse it gets. Instead, try to identify what might have triggered your anxiety. Then try to put this threat in perspective.    Keep in mind that you can t control everything about a situation. Change what you can and let the rest take its course.    Exercise -- it s a great way to relieve tension and help your body feel relaxed.    Examine your life for stress, and try to find ways to reduce it.    Avoid caffeine and nicotine. These can make anxiety symptoms worse.    Don't turn to alcohol or unprescribed medicines for relief. They only make things worse in the long run.  [employer] last reviewed this educational content on 5/1/2020 2000-2021 [employer]. All rights reserved. This information is not intended as a substitute for professional medical care. Always follow your healthcare professional's instructions.

## 2022-03-14 ENCOUNTER — TELEPHONE (OUTPATIENT)
Dept: SLEEP MEDICINE | Facility: CLINIC | Age: 32
End: 2022-03-14
Payer: COMMERCIAL

## 2022-03-26 ENCOUNTER — APPOINTMENT (OUTPATIENT)
Dept: GENERAL RADIOLOGY | Facility: CLINIC | Age: 32
End: 2022-03-26
Attending: EMERGENCY MEDICINE
Payer: COMMERCIAL

## 2022-03-26 ENCOUNTER — HOSPITAL ENCOUNTER (EMERGENCY)
Facility: CLINIC | Age: 32
Discharge: HOME | End: 2022-03-26
Attending: EMERGENCY MEDICINE | Admitting: EMERGENCY MEDICINE
Payer: COMMERCIAL

## 2022-03-26 VITALS
TEMPERATURE: 98.4 F | OXYGEN SATURATION: 98 % | SYSTOLIC BLOOD PRESSURE: 134 MMHG | HEART RATE: 46 BPM | DIASTOLIC BLOOD PRESSURE: 76 MMHG | RESPIRATION RATE: 14 BRPM

## 2022-03-26 DIAGNOSIS — R07.81 RIB PAIN: ICD-10-CM

## 2022-03-26 DIAGNOSIS — F41.0 ANXIETY ATTACK: ICD-10-CM

## 2022-03-26 LAB
ALBUMIN SERPL-MCNC: 4.4 G/DL (ref 3.4–5)
ALP SERPL-CCNC: 74 U/L (ref 40–150)
ALT SERPL W P-5'-P-CCNC: 30 U/L (ref 0–70)
ANION GAP SERPL CALCULATED.3IONS-SCNC: 7 MMOL/L (ref 3–14)
AST SERPL W P-5'-P-CCNC: 16 U/L (ref 0–45)
BASOPHILS # BLD AUTO: 0.1 10E3/UL (ref 0–0.2)
BASOPHILS NFR BLD AUTO: 1 %
BILIRUB SERPL-MCNC: 0.8 MG/DL (ref 0.2–1.3)
BUN SERPL-MCNC: 10 MG/DL (ref 7–30)
CALCIUM SERPL-MCNC: 9.9 MG/DL (ref 8.5–10.1)
CHLORIDE BLD-SCNC: 111 MMOL/L (ref 94–109)
CO2 SERPL-SCNC: 21 MMOL/L (ref 20–32)
CREAT SERPL-MCNC: 0.86 MG/DL (ref 0.66–1.25)
D DIMER PPP FEU-MCNC: 0.31 UG/ML FEU (ref 0–0.5)
EOSINOPHIL # BLD AUTO: 0.1 10E3/UL (ref 0–0.7)
EOSINOPHIL NFR BLD AUTO: 1 %
ERYTHROCYTE [DISTWIDTH] IN BLOOD BY AUTOMATED COUNT: 13.1 % (ref 10–15)
GFR SERPL CREATININE-BSD FRML MDRD: >90 ML/MIN/1.73M2
GLUCOSE BLD-MCNC: 94 MG/DL (ref 70–99)
HCT VFR BLD AUTO: 45.4 % (ref 40–53)
HGB BLD-MCNC: 15 G/DL (ref 13.3–17.7)
IMM GRANULOCYTES # BLD: 0 10E3/UL
IMM GRANULOCYTES NFR BLD: 0 %
LIPASE SERPL-CCNC: 102 U/L (ref 73–393)
LYMPHOCYTES # BLD AUTO: 2.8 10E3/UL (ref 0.8–5.3)
LYMPHOCYTES NFR BLD AUTO: 41 %
MCH RBC QN AUTO: 29.4 PG (ref 26.5–33)
MCHC RBC AUTO-ENTMCNC: 33 G/DL (ref 31.5–36.5)
MCV RBC AUTO: 89 FL (ref 78–100)
MONOCYTES # BLD AUTO: 0.6 10E3/UL (ref 0–1.3)
MONOCYTES NFR BLD AUTO: 10 %
NEUTROPHILS # BLD AUTO: 3.2 10E3/UL (ref 1.6–8.3)
NEUTROPHILS NFR BLD AUTO: 47 %
NRBC # BLD AUTO: 0 10E3/UL
NRBC BLD AUTO-RTO: 0 /100
PLATELET # BLD AUTO: 274 10E3/UL (ref 150–450)
POTASSIUM BLD-SCNC: 3.6 MMOL/L (ref 3.4–5.3)
PROT SERPL-MCNC: 8.1 G/DL (ref 6.8–8.8)
RBC # BLD AUTO: 5.11 10E6/UL (ref 4.4–5.9)
SODIUM SERPL-SCNC: 139 MMOL/L (ref 133–144)
TROPONIN I SERPL HS-MCNC: <3 NG/L
WBC # BLD AUTO: 6.7 10E3/UL (ref 4–11)

## 2022-03-26 PROCEDURE — 36415 COLL VENOUS BLD VENIPUNCTURE: CPT | Performed by: EMERGENCY MEDICINE

## 2022-03-26 PROCEDURE — 84484 ASSAY OF TROPONIN QUANT: CPT | Performed by: EMERGENCY MEDICINE

## 2022-03-26 PROCEDURE — 85025 COMPLETE CBC W/AUTO DIFF WBC: CPT | Performed by: EMERGENCY MEDICINE

## 2022-03-26 PROCEDURE — 96374 THER/PROPH/DIAG INJ IV PUSH: CPT

## 2022-03-26 PROCEDURE — 71046 X-RAY EXAM CHEST 2 VIEWS: CPT

## 2022-03-26 PROCEDURE — 80053 COMPREHEN METABOLIC PANEL: CPT | Performed by: EMERGENCY MEDICINE

## 2022-03-26 PROCEDURE — 83690 ASSAY OF LIPASE: CPT | Performed by: EMERGENCY MEDICINE

## 2022-03-26 PROCEDURE — 96375 TX/PRO/DX INJ NEW DRUG ADDON: CPT

## 2022-03-26 PROCEDURE — 99285 EMERGENCY DEPT VISIT HI MDM: CPT | Mod: 25

## 2022-03-26 PROCEDURE — 250N000011 HC RX IP 250 OP 636: Performed by: EMERGENCY MEDICINE

## 2022-03-26 PROCEDURE — 93005 ELECTROCARDIOGRAM TRACING: CPT

## 2022-03-26 PROCEDURE — 85379 FIBRIN DEGRADATION QUANT: CPT | Performed by: EMERGENCY MEDICINE

## 2022-03-26 RX ORDER — KETOROLAC TROMETHAMINE 15 MG/ML
15 INJECTION, SOLUTION INTRAMUSCULAR; INTRAVENOUS ONCE
Status: COMPLETED | OUTPATIENT
Start: 2022-03-26 | End: 2022-03-26

## 2022-03-26 RX ORDER — LORAZEPAM 2 MG/ML
1 INJECTION INTRAMUSCULAR ONCE
Status: COMPLETED | OUTPATIENT
Start: 2022-03-26 | End: 2022-03-26

## 2022-03-26 RX ADMIN — LORAZEPAM 1 MG: 2 INJECTION INTRAMUSCULAR; INTRAVENOUS at 02:44

## 2022-03-26 RX ADMIN — KETOROLAC TROMETHAMINE 15 MG: 15 INJECTION, SOLUTION INTRAMUSCULAR; INTRAVENOUS at 02:45

## 2022-03-26 NOTE — ED PROVIDER NOTES
History     Chief Complaint:  Shortness of breath and anxiety    HPI   Stef Viveros is a 31 year old male who presents with shortness of breath and anxiety.  Patient awoke in with some left upper quadrant and left lower rib pain yesterday.  He iced the area and was able to go back to sleep.  Tonight, he went to work and had worsening of his pain, which made him feel incredibly anxious.  Patient began hyperventilating.  Anxiety has been a difficulty for him in the past.  EMS was summoned due to degree of his breathing difficulty and hyperventilation.  Patient was noted to have carpopedal spasm by EMS.  No hypoxia.  On arrival here, symptoms have improved.  He states that the pain is really localized around the bottom of the left rib.  He has not had any abdominal symptoms such as nausea vomiting or other abdominal pain.  He has been eating and drinking okay.  Denies trauma.  No personal history of PE or DVT but does have a family history of this.  No bleeding symptoms.    ROS:  Review of Systems  Positive left rib pain, positive shortness of breath, positive anxiety, negative vomiting, negative abdominal pain, negative bleeding symptoms, negative cough or congestion, negative fever, negative leg pain, negative leg swelling  A full 10 point ROS was completed.  Pertinent positives are noted in the HPI.  All other systems reviewed and negative.      Allergies:  No Known Allergies     Medications:    citalopram (CELEXA) 20 MG tablet  ondansetron (ZOFRAN-ODT) 4 MG ODT tab  pantoprazole (PROTONIX) 40 MG EC tablet        Past Medical History:    No past medical history on file.  Patient Active Problem List   Diagnosis     Attention deficit hyperactivity disorder (ADHD)     Other specified pervasive developmental disorders, current or active state        Past Surgical History:    No past surgical history on file.     Family History:    family history is not on file.    Social History:   reports that he has quit  smoking. He has never used smokeless tobacco. He reports previous alcohol use. He reports current drug use. Drug: Marijuana.  PCP: Ramsey Cheung     Physical Exam   No data found.     Physical Exam    Gen: alert  HEENT: PERRL, oropharynx clear  Neck: normal ROM  CV: RRR, no murmurs, 2+ distal pulses in all 4 extremities  Chest: no tenderness over the chest wall  Pulm: breath sounds equal, lungs clear  Abd: Soft, nontender in lower abd, mild LUQ tenderness  Back: no evidence of injury  MSK: no lower extremity edema, no calf tenderness, bilateral carpal pedal spasm  Skin: no rash  Neuro: alert, appropriate conversation and interaction        Emergency Department Course   ECG:  Obtained at 02 41 read by Jessica Montez MD at 0 245, read sinus rhythm without acute ischemic change.  Ventricular rate 74 bpm FL interval 200 ms QRS duration 104 ms QT/QTc 394/437 ms    Imaging:  XR Chest 2 Views   Final Result   IMPRESSION: Mild pectus excavatum. Lungs are clear. No adenopathy or effusion. Normal cardiac size and pulmonary vascularity. Slight right convex thoracic curve. No prior x-ray available for comparison. Current study will serve as a baseline for future    follow-up.            Report per radiology    Laboratory:  Labs Ordered and Resulted from Time of ED Arrival to Time of ED Departure   COMPREHENSIVE METABOLIC PANEL - Abnormal       Result Value    Sodium 139      Potassium 3.6      Chloride 111 (*)     Carbon Dioxide (CO2) 21      Anion Gap 7      Urea Nitrogen 10      Creatinine 0.86      Calcium 9.9      Glucose 94      Alkaline Phosphatase 74      AST 16      ALT 30      Protein Total 8.1      Albumin 4.4      Bilirubin Total 0.8      GFR Estimate >90     LIPASE - Normal    Lipase 102     TROPONIN I - Normal    Troponin I High Sensitivity <3     D DIMER QUANTITATIVE - Normal    D-Dimer Quantitative 0.31     CBC WITH PLATELETS AND DIFFERENTIAL    WBC Count 6.7      RBC Count 5.11      Hemoglobin 15.0       Hematocrit 45.4      MCV 89      MCH 29.4      MCHC 33.0      RDW 13.1      Platelet Count 274      % Neutrophils 47      % Lymphocytes 41      % Monocytes 10      % Eosinophils 1      % Basophils 1      % Immature Granulocytes 0      NRBCs per 100 WBC 0      Absolute Neutrophils 3.2      Absolute Lymphocytes 2.8      Absolute Monocytes 0.6      Absolute Eosinophils 0.1      Absolute Basophils 0.1      Absolute Immature Granulocytes 0.0      Absolute NRBCs 0.0            Interventions:  Medications   LORazepam (ATIVAN) injection 1 mg (has no administration in time range)        Disposition:  The patient was discharged to home.         Medical Decision Making:  Patient presents for left rib pain and shortness of breath.  Symptoms started greater than 12 hours ago.  EKG showed no evidence of ischemia or arrhythmia.  Troponin returned negative.  In the setting of the duration of his symptoms, I did not feel that serial troponins were required.  Patient overall low risk for PE and D-dimer negative.  This is sufficient to exclude PE.  Pain much improved after Toradol.  No significant abdominal tenderness on exam to suggest other acute intra-abdominal process such as perforated ulcer bowel obstruction diverticulitis pancreatitis or other acute process.  Patient was quite anxious on arrival.  He received Ativan.  His anxiety was improved with this however he did become quite sleepy.  Patient was very sensitive to the Ativan dose given.  He was monitored closely and had no adverse events.  After period of monitoring in the ED, I feel he is safe for discharge home.  Pain is improved.  Discussed anxiety follow-up with primary care.  Patient stressed understanding.  No suicidal thoughts or thoughts of harm of self or others.  Did not feel he required DEC evaluation today.  Discharge home.    Diagnosis:    ICD-10-CM    1. Rib pain  R07.81    2. Anxiety attack  F41.0         Discharge Medications:  New Prescriptions    No  medications on file        3/26/2022   Jessica Montez*        Jessica Montez MD  03/26/22 0646

## 2022-03-26 NOTE — ED TRIAGE NOTES
"Pt arrives via EMS. 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 Patient reports pain was 7/10 and this triggered a panic attack. Patient has history of anxiety. Patient complains of fingers \"stiffening\" and some numbness in hand and feet on left side.   "

## 2022-03-26 NOTE — LETTER
March 26, 2022      To Whom It May Concern:      Stef Viveros was seen in our Emergency Department today, 03/26/22.  I expect his condition to improve over the next two days.  He may return to work/school when improved.    Sincerely,        Israel Vazquez RN

## 2022-03-26 NOTE — ED NOTES
Bed: ED13  Expected date:   Expected time:   Means of arrival:   Comments:  Hillary - 29 M abd pain

## 2022-03-26 NOTE — DISCHARGE INSTRUCTIONS
Use Tylenol or ibuprofen as needed for left rib pain.  Return to the emergency department for increasing pain, fever, shortness of breath, coughing or vomiting blood, or for pain that migrates or changes location of the abdomen.

## 2022-03-28 LAB
ATRIAL RATE - MUSE: 74 BPM
DIASTOLIC BLOOD PRESSURE - MUSE: NORMAL MMHG
INTERPRETATION ECG - MUSE: NORMAL
P AXIS - MUSE: 68 DEGREES
PR INTERVAL - MUSE: 200 MS
QRS DURATION - MUSE: 104 MS
QT - MUSE: 394 MS
QTC - MUSE: 437 MS
R AXIS - MUSE: 72 DEGREES
SYSTOLIC BLOOD PRESSURE - MUSE: NORMAL MMHG
T AXIS - MUSE: 55 DEGREES
VENTRICULAR RATE- MUSE: 74 BPM

## 2022-04-01 ENCOUNTER — OFFICE VISIT (OUTPATIENT)
Dept: FAMILY MEDICINE | Facility: CLINIC | Age: 32
End: 2022-04-01
Payer: COMMERCIAL

## 2022-04-01 VITALS
TEMPERATURE: 98.2 F | SYSTOLIC BLOOD PRESSURE: 114 MMHG | DIASTOLIC BLOOD PRESSURE: 76 MMHG | WEIGHT: 153 LBS | BODY MASS INDEX: 22.59 KG/M2 | HEART RATE: 77 BPM

## 2022-04-01 DIAGNOSIS — R07.81 RIB PAIN ON LEFT SIDE: Primary | ICD-10-CM

## 2022-04-01 PROCEDURE — 99213 OFFICE O/P EST LOW 20 MIN: CPT | Performed by: FAMILY MEDICINE

## 2022-04-01 NOTE — LETTER
April 1, 2022      Stef Viveros   [place] [id number]        To Whom It May Concern:    Stef Viveros  was seen on 04/01/22.      Sincerely,        Hansa Alex MD

## 2022-04-01 NOTE — PATIENT INSTRUCTIONS
Take a deep breath at least once per hour and hold it for 4-5 seconds before breathing out.  Acetaminophen (Tylenol) 500mg tablets.  You may take 2 tabs every 4-6 hours as needed  Ibuprofen (Advil, Motrin) 200mg tablets.  You may take 3 tabs every 6-8 hours as needed with food.  Physical Therapy or chiropractic care

## 2022-04-21 ENCOUNTER — TELEPHONE (OUTPATIENT)
Dept: SLEEP MEDICINE | Facility: CLINIC | Age: 32
End: 2022-04-21
Payer: COMMERCIAL

## 2022-04-21 NOTE — TELEPHONE ENCOUNTER
I had called patients insurance ([facility]) to see if a prior authorization is needed for a CPAP machine. However the rep had informed me that his insurance/policy has been inactive since April 9 2022. I then tried to call patient to ask for an update on his insurance and then get him scheduled for a CPAP appointment. However when I called, the  had stated that I was unable to leave a voicemail as the voice mailbox has not been set up yet.